# Patient Record
Sex: FEMALE | Race: WHITE
[De-identification: names, ages, dates, MRNs, and addresses within clinical notes are randomized per-mention and may not be internally consistent; named-entity substitution may affect disease eponyms.]

---

## 2022-01-31 ENCOUNTER — HOSPITAL ENCOUNTER (OUTPATIENT)
Dept: HOSPITAL 76 - DI | Age: 77
Discharge: HOME | End: 2022-01-31
Attending: SURGERY
Payer: MEDICARE

## 2022-01-31 VITALS — SYSTOLIC BLOOD PRESSURE: 135 MMHG | DIASTOLIC BLOOD PRESSURE: 81 MMHG

## 2022-01-31 DIAGNOSIS — N60.92: Primary | ICD-10-CM

## 2022-01-31 DIAGNOSIS — Z87.891: ICD-10-CM

## 2022-01-31 PROCEDURE — 0HBU0ZZ EXCISION OF LEFT BREAST, OPEN APPROACH: ICD-10-PCS | Performed by: SURGERY

## 2022-01-31 PROCEDURE — 19285 PERQ DEV BREAST 1ST US IMAG: CPT

## 2022-01-31 PROCEDURE — 19301 PARTIAL MASTECTOMY: CPT

## 2022-01-31 NOTE — OPERATIVE REPORT
Operative Report





- General


Procedure Date: 01/31/22


Planned Procedure: 





Left Breast Lumpectomy following Needle Localization


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


Procedure Performed: 





Left Breast Lumpectomy following Needle Localization


Post Op Diagnosis: Atypical Lobular Hyperplasia Left Breast





- Procedure Note


Primary Surgeon: Chelsea


Anesthesia Provider: SALENA Matos


Anesthesia Technique: General LMA


Pathology: 





1. Left breast specimen - oriented


2. Additional supero-lateral margin - oriented


3. Additional infero-lateral margin - oriented - contains clip on specimen 

radiograph


Estimated Blood Loss (mL): 10


Indications: 


Biopsy-proven atypical lobular hyperplasia left breast





Findings: 





Dense fibrous tissue.  Clip contained in the third specimen.


Complications: 





Localizing wire dislodged during prep andprior to the start of the procedure





- Other


Other Information/Narrative: 


After obtaining informed consent, the patient was brought to the operating room 

and placed in the supine position on the operating table.  Following successful 

induction of general endotracheal anesthesia, appropriate padding of all bony 

prominences, and placement of appropriate monitors, the left breast was prepped 

and draped in the standard surgical fashion.  A timeout was held per scope 

protocol.  All elements of the surgical safety checklist were followed before, 

during, and after the procedure.


The position of the wire within the Left breast was noted. Unfortunately I 

noticed that the wire had been pulled back significantly at some point prior to 

surgical intervention.  It appeared to be only minimally under the skin surface.

 Measurements were made from nipple areolar complex in the orientation noted on 

ultrasound which was 11:00 and 3 cm from the nipple areolar complex.  The area 

over the mass and in the periareolar region was infiltrated with a mixture of 

local anesthetics to create to field block.  A periareolar incision was then 

created at the superior aspect of the left nipple areola complex. This was 

carried down through the skin and subcutaneous tissue.  The area of the breast 

tissue in this region was then grasped with an Allis clamp and an approximately 

4 cm segment of full-thickness tissue excised.  This was marked for orientation 

and submitted for specimen radiograph.  A clip was not clearly seen.  An 

additional superior-lateral and inferior-lateral margins were obtained, Marked 

as the previous specimen, and submitted separately for radiograph.The marking 

clip was identified in the inferolateral specimen.   All specimens were marked 

with a short stitch superior, long stitch lateral, and double stitch anterior.  

The wound was checked for hemostasis.  It was irrigated again with sterile 

water. The wound was then closed in 2 layers with Vicryl and Monocryl sutures.  

Dermabond was applied to the skin incision.All sponge, needle, and instrument 

counts were correct at the conclusion of the case.  The patient was let awakened

anesthesia without difficulty and taken to the postanesthesia care unit in good 

condition.

## 2022-01-31 NOTE — ANESTHESIA POST OP EVALUATION
Anesthesia Post Eval





- Post Anesthesia Eval


Vitals: 





                                Last Vital Signs











Temp  36.4 C L  01/31/22 13:29


 


Pulse  76   01/31/22 13:29


 


Resp  15   01/31/22 13:29


 


BP  135/81 H  01/31/22 13:29


 


Pulse Ox  99   01/31/22 13:29











CV Function Including HR & BP: Stable


Pain Control: Satisfactory


Nausea & Vomiting: Negative


Mental Status: Baseline


Respiratory Status: Airway Patent


Hydration Status: Satisfactory


Anesthesia Complications: None

## 2022-01-31 NOTE — ANESTHESIA
Pre-Anesthesia VS, & Labs





- Diagnosis





left breast atypical hyperplasia





- Procedure





left breast lumpectomy after needle localization


Vital Signs: 





                                        











Temp Pulse Resp BP Pulse Ox


 


 36.1 C L  73   18   166/96 H  98 


 


 01/31/22 07:42  01/31/22 07:42  01/31/22 07:42  01/31/22 07:42  01/31/22 07:42











Height: 5 ft 8 in


Weight (kg): 75 kg


Body Mass Index: 25.1


BMI Classification: Overweight





- NPO


>8 hours





- Pregnancy


Is Patient Pregnant?: No





Home Medications and Allergies


Home Medications: 


Ambulatory Orders





Atorvastatin [Lipitor] 20 mg DAILY 01/28/22 


Metoprolol Succinate [Toprol Xl] 25 mg PO DAILY 01/28/22 


hydroCHLOROthiazide [Hydrodiuril] 25 mg PO DAILY 01/28/22 











                                        





Atorvastatin [Lipitor] 20 mg DAILY 01/28/22 


Metoprolol Succinate [Toprol Xl] 25 mg PO DAILY 01/28/22 


hydroCHLOROthiazide [Hydrodiuril] 25 mg PO DAILY 01/28/22 








Allergies/Adverse Reactions: 


                                    Allergies











Allergy/AdvReac Type Severity Reaction Status Date / Time


 


latex Allergy  Itching Verified 01/31/22 07:52


 


Penicillins Allergy  Anaphylaxis Verified 01/28/22 12:54














Anes History & Medical History





- Anesthetic History


Anesthesia Complications: reports: No previous complications





- Medical History


Cardiovascular: reports: Hypertension, High cholesterol


Pulmonary: reports: None


Gastrointestinal: reports: None


Urinary: reports: None


Neuro: reports: Head injury (2007 fell 15 feet, tore scalene muscles and CHI. 

Fractured right wrist), Other (right arm CRPS)


Musculoskeletal: reports: None


Endocrine/Autoimmune: reports: None


Blood Disorders: reports: None


Skin: reports: None


Smoking Status: Former smoker (quit 1972)


Psychosocial: reports: No issues indicated


History of Cancer?: No





- Surgical History


Gynecologic: reports: Hysterectomy


Orthopedic: reports: Other





Exam


General: Alert, Oriented x3, Cooperative, No acute distress


Dental: WNL


Mouth Opening: 3 Fingerbreadth


Neck Mobility: Reduced


Mallampati classification: II


Thyromental Distance: less than 4 cm (1 FB)


Mental/Cognitive Status: Alert/Oriented X3, Normal for patient





Plan


Anesthesia Type: General


Consent for Procedure(s) Verified and Reviewed: Yes


Code Status: Attempt Resuscitation


ASA classification: 2-Mild systemic disease


Is this case an emergency?: No

## 2022-02-01 NOTE — ULTRASOUND REPORT
ULTRASOUND GUIDED WIRE LOCALIZATION LEFT BREAST WITH POST ULTRASOUND [id number]

CLINICAL: Left breast mass with wire placement.  

 

Correlation is made to exams dated:  12/15/2021 ultrasound biopsy, 12/15/2021 mammogram, 2021 u
ltrasound, 2021 breast MRI, 10/18/2021 mammogram, and 2018 mammogram - [facility].  

A wire localization using ultrasound guidance was performed for the lobulated area of asymmetry locat
ed in the left breast at 11 o'clock middle depth.  This was described on the previous mammography and
 ultrasound reports.  The skin was prepped in the usual manner.  Local anesthetic was administered to
 the access site.  The localization was approached from the medial aspect.  A J-hook wire was inserte
d adjacent to the marker under ultrasound guidance.  A sterile dressing was applied to the access sit
e.  Post placement ultrasound imaging was obtained.  

 

 

IMPRESSION: WIRE LOCALIZATION

Wire localization for the area of asymmetry in the left breast at 11 o'clock middle depth was success
ful.  

 

 

This exam was interpreted at Station ID: [id number].  

 

Viry melara/:2022 09:52:56

BI-RADS CATEGORY: () - 

Unspecified - other

 

recall n/a

LATERALITY: (B)